# Patient Record
Sex: FEMALE | Race: WHITE | NOT HISPANIC OR LATINO | Employment: FULL TIME | URBAN - METROPOLITAN AREA
[De-identification: names, ages, dates, MRNs, and addresses within clinical notes are randomized per-mention and may not be internally consistent; named-entity substitution may affect disease eponyms.]

---

## 2024-10-01 ENCOUNTER — OFFICE VISIT (OUTPATIENT)
Dept: OBSTETRICS AND GYNECOLOGY | Facility: CLINIC | Age: 19
End: 2024-10-01
Payer: COMMERCIAL

## 2024-10-01 VITALS
DIASTOLIC BLOOD PRESSURE: 80 MMHG | WEIGHT: 193 LBS | SYSTOLIC BLOOD PRESSURE: 110 MMHG | BODY MASS INDEX: 32.95 KG/M2 | HEIGHT: 64 IN

## 2024-10-01 DIAGNOSIS — L02.32 BOIL OF BUTTOCK: ICD-10-CM

## 2024-10-01 DIAGNOSIS — Z23 IMMUNIZATION DUE: ICD-10-CM

## 2024-10-01 DIAGNOSIS — Z23 NONAVALENT HUMAN PAPILLOMA VIRUS (HPV) VACCINE FOR HPV TYPES 6, 11, 16, 18, 31, 33, 45, 52, AND 58 ADMINISTERED: ICD-10-CM

## 2024-10-01 DIAGNOSIS — Z30.011 ENCOUNTER FOR INITIAL PRESCRIPTION OF CONTRACEPTIVE PILLS: ICD-10-CM

## 2024-10-01 DIAGNOSIS — Z01.419 ENCOUNTER FOR ANNUAL ROUTINE GYNECOLOGICAL EXAMINATION: Primary | ICD-10-CM

## 2024-10-01 RX ORDER — BUPROPION HYDROCHLORIDE 150 MG/1
1 TABLET ORAL DAILY
COMMUNITY
Start: 2024-07-25

## 2024-10-01 RX ORDER — DROSPIRENONE AND ETHINYL ESTRADIOL 0.02-3(28)
1 KIT ORAL DAILY
Qty: 84 TABLET | Refills: 3 | Status: SHIPPED | OUTPATIENT
Start: 2024-10-01

## 2024-10-01 RX ORDER — LAMOTRIGINE 100 MG/1
1 TABLET ORAL DAILY
COMMUNITY
Start: 2024-07-25

## 2024-10-01 NOTE — PROGRESS NOTES
Gynecologic Annual Exam Note        Gynecologic Exam        Subjective     HPI  Jennifer España is a 19 y.o.  female who presents for annual well woman exam as a new patient. There were no changes to her medical or surgical history since her last visit.. 2024 Her periods occur every 28-30, lasting 5-6 days.  The flow is light to heavy thru our period. She reports dysmenorrhea is severe occurring first 1-2 days of flow. She occasionally misses work due to it but is most severe at night Marital Status: single.  She is sexually active. She has had new partners.. STD testing recommendations have been explained to the patient and she does desire STD testing.    She previously had issues with weight gain and mood swings on Junel 1/20.     The patient would like to discuss the following complaints today: denies    Additional OB/GYN History   contraceptive methods: None  Desires to: start contraception  Thromboembolic Disease: none  History of migraines: no  Age of menarche: 7    History of STD: no    Last Pap : never. Results: N/A. HPV:  na .   Last Completed Pap Smear       This patient has no relevant Health Maintenance data.             History of abnormal Pap smear: no  Gardasil status:would like to start  Family history of uterine, colon, breast, or ovarian cancer: yes - Pgm breast  Performs monthly Self-Breast Exam: no  Exercises Regularly:semi  Feelings of Anxiety or Depression: yes - medication  Tobacco Usage?: Yes Jennifer España  reports that she has been smoking cigarettes. She does not have any smokeless tobacco history on file. I have educated her on the risk of diseases from using tobacco products such as cancer, COPD, and heart disease.     I advised her to quit and she is willing to quit. We have discussed the following method/s for tobacco cessation:  Education Material, Counseling, and Cold [place].         Current Outpatient Medications:     buPROPion XL (WELLBUTRIN XL) 150 MG 24 hr  "tablet, Take 1 tablet by mouth Daily., Disp: , Rfl:     lamoTRIgine (LaMICtal) 100 MG tablet, Take 1 tablet by mouth Daily., Disp: , Rfl:     drospirenone-ethinyl estradiol (FELIX,GIANVI) 3-0.02 MG per tablet, Take 1 tablet by mouth Daily., Disp: 84 tablet, Rfl: 3  No current facility-administered medications for this visit.     Patient denies the need for medication refills today.    OB History          1    Para        Term                AB   1    Living   0         SAB   1    IAB        Ectopic        Molar        Multiple        Live Births                    Health Maintenance   Topic Date Due    BMI FOLLOWUP  Never done    TDAP/TD VACCINES (2 - Td or Tdap) 2024    INFLUENZA VACCINE  Never done    COVID-19 Vaccine (2023- season) Never done    HEPATITIS C SCREENING  Never done    ANNUAL PHYSICAL  Never done    CHLAMYDIA SCREENING  Never done    PAP SMEAR  Never done    HPV VACCINES (2 - 3-dose series) 10/29/2024    Annual Gynecologic Pelvic and Breast Exam  10/02/2025    MENINGOCOCCAL VACCINE  Completed    Pneumococcal Vaccine 0-64  Aged Out       Past Medical History:   Diagnosis Date    ADHD     Anxiety     Borderline personality disorder     Depression         Past Surgical History:   Procedure Laterality Date    CYST REMOVAL      on chest    TONSILLECTOMY      WISDOM TOOTH EXTRACTION         The additional following portions of the patient's history were reviewed and updated as appropriate: allergies, current medications, past family history, past medical history, past social history, past surgical history, and problem list.    Review of Systems      I have reviewed and agree with the HPI, ROS, and historical information as entered above. Kayden Easley MD         Objective   /80   Ht 162.6 cm (64\")   Wt 87.5 kg (193 lb)   LMP 2024   BMI 33.13 kg/m²     Physical Exam  Vitals and nursing note reviewed. Exam conducted with a chaperone present.   Constitutional:       " General: She is not in acute distress.     Appearance: Normal appearance.   HENT:      Head: Normocephalic and atraumatic.   Pulmonary:      Effort: Pulmonary effort is normal.   Chest:   Breasts:     Right: Normal. No mass, nipple discharge or skin change.      Left: Normal. No mass, nipple discharge or skin change.   Abdominal:      General: There is no distension.      Palpations: Abdomen is soft. There is no mass.      Tenderness: There is no abdominal tenderness.   Genitourinary:     Exam position: Lithotomy position.      Labia:         Right: No lesion.         Left: No lesion.       Vagina: Normal. No vaginal discharge.      Cervix: Normal. No cervical motion tenderness or lesion.      Uterus: Normal.       Adnexa: Right adnexa normal and left adnexa normal.        Right: No tenderness or fullness.          Left: No tenderness or fullness.         Musculoskeletal:         General: Normal range of motion.   Lymphadenopathy:      Upper Body:      Right upper body: No axillary adenopathy.      Left upper body: No axillary adenopathy.   Neurological:      General: No focal deficit present.      Mental Status: She is alert.            Assessment and Plan    Problem List Items Addressed This Visit       Boil of buttock    Current Assessment & Plan     Discourage picking and popping.  Encouraged warm compress.  Patient popped it last night.  No evidence of superimposed infection currently.  If no improvement over the next 5 days or so, can consider antibiotics.         Relevant Medications    drospirenone-ethinyl estradiol (FELIX,GIANSHAMIKA) 3-0.02 MG per tablet     Other Visit Diagnoses       Encounter for annual routine gynecological examination    -  Primary    Relevant Orders    LIQUID-BASED PAP SMEAR WITH HPV GENOTYPING IF ASCUS ([facility])    Hepatitis B Virus (HBV) Screening and Diagnosis    HCV Antibody Rfx To Qnt PCR    HIV-1 / O / 2 Ag / Antibody    Treponema pallidum AB w/Reflex RPR    Nonavalent human  papilloma virus (HPV) vaccine for HPV types 6, 11, 16, 18, 31, 33, 45, 52, and 58 administered        Relevant Medications    HPV 9-Valent Recomb Vaccine suspension 0.5 mL (Completed) (Start on 10/1/2024  2:30 PM)    Encounter for initial prescription of contraceptive pills        Relevant Medications    drospirenone-ethinyl estradiol (FELIX,GIANVI) 3-0.02 MG per tablet    Immunization due                GYN annual well woman exam.   Reviewed pap guidelines.   Encouraged use of condoms for STD prevention.  OCP's/Vaginal Ring - Discussed side effects of nausea, BTB, headaches, breast tenderness and slight weight gain in the first three cycles.  Understands risks of blood clots, stroke, and theoretical risk of breast cancer.  Denies family history of blood clots.  Fibrocystic breast changes - Encouraged decreasing caffeine, supportive bra, low dose vitamin E supplementation.  Reviewed monthly self breast exams.  Instructed to call with lumps, pain, or breast discharge.    Reviewed BMI and weight loss as preventative health measures.   Reviewed exercise as a preventative health measures.   Smoking cessation encouraged.  Resources reviewed. (See above for full cessation details).  Return in about 6 weeks (around 11/12/2024) for Birth control follow up and HPV #2.    Kayden Easley MD  10/01/2024

## 2024-10-01 NOTE — ASSESSMENT & PLAN NOTE
Discourage picking and popping.  Encouraged warm compress.  Patient popped it last night.  No evidence of superimposed infection currently.  If no improvement over the next 5 days or so, can consider antibiotics.

## 2024-10-06 LAB — REF LAB TEST METHOD: NORMAL

## 2024-12-22 ENCOUNTER — HOSPITAL ENCOUNTER (EMERGENCY)
Facility: HOSPITAL | Age: 19
Discharge: HOME | End: 2024-12-22
Attending: STUDENT IN AN ORGANIZED HEALTH CARE EDUCATION/TRAINING PROGRAM | Admitting: STUDENT IN AN ORGANIZED HEALTH CARE EDUCATION/TRAINING PROGRAM
Payer: COMMERCIAL

## 2024-12-22 ENCOUNTER — APPOINTMENT (OUTPATIENT)
Dept: ULTRASOUND IMAGING | Facility: HOSPITAL | Age: 19
End: 2024-12-22
Payer: COMMERCIAL

## 2024-12-22 VITALS
BODY MASS INDEX: 33.29 KG/M2 | DIASTOLIC BLOOD PRESSURE: 82 MMHG | OXYGEN SATURATION: 95 % | SYSTOLIC BLOOD PRESSURE: 128 MMHG | RESPIRATION RATE: 16 BRPM | TEMPERATURE: 98.6 F | WEIGHT: 195 LBS | HEART RATE: 97 BPM | HEIGHT: 64 IN

## 2024-12-22 DIAGNOSIS — N92.1 MENORRHAGIA WITH IRREGULAR CYCLE: Primary | ICD-10-CM

## 2024-12-22 LAB
ALBUMIN SERPL-MCNC: 4.6 G/DL (ref 3.5–5.2)
ALBUMIN/GLOB SERPL: 1.5 G/DL
ALP SERPL-CCNC: 144 U/L (ref 39–117)
ALT SERPL W P-5'-P-CCNC: 31 U/L (ref 1–33)
ANION GAP SERPL CALCULATED.3IONS-SCNC: 13.1 MMOL/L (ref 5–15)
AST SERPL-CCNC: 23 U/L (ref 1–32)
B-HCG UR QL: NEGATIVE
BACTERIA UR QL AUTO: NORMAL /HPF
BASOPHILS # BLD AUTO: 0.05 10*3/MM3 (ref 0–0.2)
BASOPHILS NFR BLD AUTO: 0.5 % (ref 0–1.5)
BILIRUB SERPL-MCNC: 0.2 MG/DL (ref 0–1.2)
BILIRUB UR QL STRIP: NEGATIVE
BUN SERPL-MCNC: 10 MG/DL (ref 6–20)
BUN/CREAT SERPL: 12.5 (ref 7–25)
CALCIUM SPEC-SCNC: 9.2 MG/DL (ref 8.6–10.5)
CHLORIDE SERPL-SCNC: 104 MMOL/L (ref 98–107)
CLARITY UR: CLEAR
CO2 SERPL-SCNC: 22.9 MMOL/L (ref 22–29)
COLOR UR: YELLOW
CREAT SERPL-MCNC: 0.8 MG/DL (ref 0.57–1)
DEPRECATED RDW RBC AUTO: 41 FL (ref 37–54)
EGFRCR SERPLBLD CKD-EPI 2021: 107.7 ML/MIN/1.73
EOSINOPHIL # BLD AUTO: 0 10*3/MM3 (ref 0–0.4)
EOSINOPHIL NFR BLD AUTO: 0 % (ref 0.3–6.2)
ERYTHROCYTE [DISTWIDTH] IN BLOOD BY AUTOMATED COUNT: 12.9 % (ref 12.3–15.4)
GLOBULIN UR ELPH-MCNC: 3 GM/DL
GLUCOSE SERPL-MCNC: 114 MG/DL (ref 65–99)
GLUCOSE UR STRIP-MCNC: NEGATIVE MG/DL
HCT VFR BLD AUTO: 39.6 % (ref 34–46.6)
HGB BLD-MCNC: 12.9 G/DL (ref 12–15.9)
HGB UR QL STRIP.AUTO: ABNORMAL
HYALINE CASTS UR QL AUTO: NORMAL /LPF
IMM GRANULOCYTES # BLD AUTO: 0.02 10*3/MM3 (ref 0–0.05)
IMM GRANULOCYTES NFR BLD AUTO: 0.2 % (ref 0–0.5)
KETONES UR QL STRIP: NEGATIVE
LEUKOCYTE ESTERASE UR QL STRIP.AUTO: NEGATIVE
LYMPHOCYTES # BLD AUTO: 5.1 10*3/MM3 (ref 0.7–3.1)
LYMPHOCYTES NFR BLD AUTO: 54.1 % (ref 19.6–45.3)
MCH RBC QN AUTO: 28.5 PG (ref 26.6–33)
MCHC RBC AUTO-ENTMCNC: 32.6 G/DL (ref 31.5–35.7)
MCV RBC AUTO: 87.4 FL (ref 79–97)
MONOCYTES # BLD AUTO: 0.76 10*3/MM3 (ref 0.1–0.9)
MONOCYTES NFR BLD AUTO: 8.1 % (ref 5–12)
NEUTROPHILS NFR BLD AUTO: 3.49 10*3/MM3 (ref 1.7–7)
NEUTROPHILS NFR BLD AUTO: 37.1 % (ref 42.7–76)
NITRITE UR QL STRIP: NEGATIVE
NRBC BLD AUTO-RTO: 0 /100 WBC (ref 0–0.2)
PH UR STRIP.AUTO: 7 [PH] (ref 5–8)
PLATELET # BLD AUTO: 403 10*3/MM3 (ref 140–450)
PMV BLD AUTO: 9.4 FL (ref 6–12)
POTASSIUM SERPL-SCNC: 3.5 MMOL/L (ref 3.5–5.2)
PROT SERPL-MCNC: 7.6 G/DL (ref 6–8.5)
PROT UR QL STRIP: NEGATIVE
RBC # BLD AUTO: 4.53 10*6/MM3 (ref 3.77–5.28)
RBC # UR STRIP: NORMAL /HPF
REF LAB TEST METHOD: NORMAL
SODIUM SERPL-SCNC: 140 MMOL/L (ref 136–145)
SP GR UR STRIP: 1.01 (ref 1–1.03)
SQUAMOUS #/AREA URNS HPF: NORMAL /HPF
UROBILINOGEN UR QL STRIP: ABNORMAL
WBC # UR STRIP: NORMAL /HPF
WBC NRBC COR # BLD AUTO: 9.42 10*3/MM3 (ref 3.4–10.8)

## 2024-12-22 PROCEDURE — 99284 EMERGENCY DEPT VISIT MOD MDM: CPT | Performed by: STUDENT IN AN ORGANIZED HEALTH CARE EDUCATION/TRAINING PROGRAM

## 2024-12-22 PROCEDURE — 76830 TRANSVAGINAL US NON-OB: CPT

## 2024-12-22 PROCEDURE — 85025 COMPLETE CBC W/AUTO DIFF WBC: CPT | Performed by: NURSE PRACTITIONER

## 2024-12-22 PROCEDURE — 81025 URINE PREGNANCY TEST: CPT | Performed by: NURSE PRACTITIONER

## 2024-12-22 PROCEDURE — 80053 COMPREHEN METABOLIC PANEL: CPT | Performed by: NURSE PRACTITIONER

## 2024-12-22 PROCEDURE — 81001 URINALYSIS AUTO W/SCOPE: CPT | Performed by: NURSE PRACTITIONER

## 2024-12-22 RX ORDER — ARIPIPRAZOLE 5 MG/1
1 TABLET ORAL DAILY
COMMUNITY
Start: 2024-11-27

## 2024-12-22 RX ORDER — IBUPROFEN 600 MG/1
600 TABLET, FILM COATED ORAL EVERY 6 HOURS PRN
Qty: 12 TABLET | Refills: 0 | Status: SHIPPED | OUTPATIENT
Start: 2024-12-22 | End: 2024-12-25

## 2024-12-22 NOTE — ED PROVIDER NOTES
"Subjective:  History of Present Illness:    Patient is a 19-year-old female without terming health history.  Presents to the ER today for intermittent menstrual bleeding.  Reports that bleeding has been going on for last couple weeks.  Reports that it got heavier over the last 24 hours.  Went through a super plus tampon at 30 minutes.  She denies dizziness chest pain shortness of breath or any other associated symptom.  Denies OTC medication or home remedy.  Denies alleviating or exacerbating factors.    Nurses Notes reviewed and agree, including vitals, allergies, social history and prior medical history.     REVIEW OF SYSTEMS: All systems reviewed and not pertinent unless noted.  Review of Systems   Genitourinary:  Positive for vaginal bleeding.   All other systems reviewed and are negative.      Past Medical History:   Diagnosis Date    ADHD     Anxiety     Borderline personality disorder     Depression        Allergies:    Patient has no known allergies.      Past Surgical History:   Procedure Laterality Date    CYST REMOVAL      on chest    TONSILLECTOMY      WISDOM TOOTH EXTRACTION           Social History     Socioeconomic History    Marital status: Single   Tobacco Use    Smoking status: Every Day     Types: Cigarettes    Tobacco comments:     vaping   Substance and Sexual Activity    Alcohol use: Yes    Drug use: Never    Sexual activity: Yes     Partners: Male     Birth control/protection: None         Family History   Problem Relation Age of Onset    Breast cancer Maternal Grandmother     Ovarian cancer Neg Hx     Uterine cancer Neg Hx     Colon cancer Neg Hx        Objective  Physical Exam:  /82 (BP Location: Left arm, Patient Position: Sitting)   Pulse 97   Temp 98.6 °F (37 °C) (Oral)   Resp 16   Ht 162.6 cm (64\")   Wt 88.5 kg (195 lb)   LMP 12/08/2024   SpO2 91%   BMI 33.47 kg/m²      Physical Exam  Vitals and nursing note reviewed.   Constitutional:       Appearance: Normal appearance. " She is normal weight.   HENT:      Head: Normocephalic and atraumatic.      Nose: Nose normal.      Mouth/Throat:      Mouth: Mucous membranes are moist.      Pharynx: Oropharynx is clear.   Eyes:      Extraocular Movements: Extraocular movements intact.      Conjunctiva/sclera: Conjunctivae normal.      Pupils: Pupils are equal, round, and reactive to light.   Cardiovascular:      Rate and Rhythm: Normal rate and regular rhythm.      Pulses: Normal pulses.      Heart sounds: Normal heart sounds.   Pulmonary:      Effort: Pulmonary effort is normal.      Breath sounds: Normal breath sounds.   Abdominal:      General: Abdomen is flat. Bowel sounds are normal.      Palpations: Abdomen is soft.   Musculoskeletal:         General: Normal range of motion.      Cervical back: Normal range of motion and neck supple.   Skin:     General: Skin is warm and dry.      Capillary Refill: Capillary refill takes less than 2 seconds.   Neurological:      General: No focal deficit present.      Mental Status: She is alert and oriented to person, place, and time. Mental status is at baseline.   Psychiatric:         Mood and Affect: Mood normal.         Behavior: Behavior normal.         Thought Content: Thought content normal.         Judgment: Judgment normal.         Procedures    ED Course:         Lab Results (last 24 hours)       Procedure Component Value Units Date/Time    CBC Auto Differential [[id number]]  (Abnormal) Collected: 12/22/24 1544    Specimen: Blood Updated: 12/22/24 1550     WBC 9.42 10*3/mm3      RBC 4.53 10*6/mm3      Hemoglobin 12.9 g/dL      Hematocrit 39.6 %      MCV 87.4 fL      MCH 28.5 pg      MCHC 32.6 g/dL      RDW 12.9 %      RDW-SD 41.0 fl      MPV 9.4 fL      Platelets 403 10*3/mm3      Neutrophil % 37.1 %      Lymphocyte % 54.1 %      Monocyte % 8.1 %      Eosinophil % 0.0 %      Basophil % 0.5 %      Immature Grans % 0.2 %      Neutrophils, Absolute 3.49 10*3/mm3      Lymphocytes, Absolute 5.10  10*3/mm3      Monocytes, Absolute 0.76 10*3/mm3      Eosinophils, Absolute 0.00 10*3/mm3      Basophils, Absolute 0.05 10*3/mm3      Immature Grans, Absolute 0.02 10*3/mm3      nRBC 0.0 /100 WBC     Comprehensive Metabolic Panel [[id number]]  (Abnormal) Collected: 12/22/24 1544    Specimen: Blood Updated: 12/22/24 1609     Glucose 114 mg/dL      BUN 10 mg/dL      Creatinine 0.80 mg/dL      Sodium 140 mmol/L      Potassium 3.5 mmol/L      Chloride 104 mmol/L      CO2 22.9 mmol/L      Calcium 9.2 mg/dL      Total Protein 7.6 g/dL      Albumin 4.6 g/dL      ALT (SGPT) 31 U/L      AST (SGOT) 23 U/L      Alkaline Phosphatase 144 U/L      Total Bilirubin 0.2 mg/dL      Globulin 3.0 gm/dL      A/G Ratio 1.5 g/dL      BUN/Creatinine Ratio 12.5     Anion Gap 13.1 mmol/L      eGFR 107.7 mL/min/1.73     Narrative:      GFR Categories in Chronic Kidney Disease (CKD)      GFR Category          GFR (mL/min/1.73)    Interpretation  G1                     90 or greater         Normal or high (1)  G2                      60-89                Mild decrease (1)  G3a                   45-59                Mild to moderate decrease  G3b                   30-44                Moderate to severe decrease  G4                    15-29                Severe decrease  G5                    14 or less           Kidney failure          (1)In the absence of evidence of kidney disease, neither GFR category G1 or G2 fulfill the criteria for CKD.    eGFR calculation 2021 CKD-EPI creatinine equation, which does not include race as a factor    Pregnancy, Urine - Urine, Clean Catch [[id number]]  (Normal) Collected: 12/22/24 1701    Specimen: Urine, Clean Catch Updated: 12/22/24 1714     HCG, Urine QL Negative    Urinalysis With Microscopic If Indicated (No Culture) - Urine, Clean Catch [[id number]]  (Abnormal) Collected: 12/22/24 1701    Specimen: Urine, Clean Catch Updated: 12/22/24 1714     Color, UA Yellow     Appearance, UA Clear     pH, UA 7.0      Specific Gravity, UA 1.008     Glucose, UA Negative     Ketones, UA Negative     Bilirubin, UA Negative     Blood, UA Trace     Protein, UA Negative     Leuk Esterase, UA Negative     Nitrite, UA Negative     Urobilinogen, UA 0.2 E.U./dL    Urinalysis, Microscopic Only - Urine, Clean Catch [[id number]] Collected: 12/22/24 1701    Specimen: Urine, Clean Catch Updated: 12/22/24 1722     RBC, UA 0-2 /HPF      WBC, UA 0-2 /HPF      Bacteria, UA None Seen /HPF      Squamous Epithelial Cells, UA 0-2 /HPF      Hyaline Casts, UA None Seen /LPF      Methodology Manual Light Microscopy             US Non-ob Transvaginal    Result Date: 12/22/2024  FINAL REPORT TECHNIQUE: null CLINICAL HISTORY: r/o right ovarian torsion. heavy vaginal bleeding. COMPARISON: null FINDINGS: Ultrasound pelvis transvaginal. COMPARISON: None Technique: Real time sonographic imaging, including color-flow imaging, was performed by the sonographer. Multiple representative static images were saved for review. FINDINGS: Anteverted uterus appears normal and measures 8.0 x 3.2 x 4.1 cm. No uterine fibroids identified. Endometrium: 3 mm, normal. Right ovary appears normal and measures 3.2 x 2.2 x 2.1 cm. Normal color and arterial and venous spectral Doppler. No right adnexal mass identified. Normal appearing physiologic follicles/cysts. Left ovary appears normal and measures 3.5 x 2.9 x 1.9 cm. Normal color and arterial spectral Doppler. No left adnexal mass identified. Normal appearing physiologic follicles/cysts. No free fluid identified in the pelvic cul-de-sac.     Impression: IMPRESSION: 1. No cause for patient's symptoms identified. No evidence of ovarian torsion. Authenticated and Electronically Signed by Manoj Stewart MD on 12/22/2024 05:27:03 PM        MDM      Initial impression of presenting illness: Patient is a 19-year-old female without terming health history.  Presents to the ER today for intermittent menstrual bleeding.  Reports that bleeding  has been going on for last couple weeks.  Reports that it got heavier over the last 24 hours.  Went through a super plus tampon at 30 minutes.  She denies dizziness chest pain shortness of breath or any other associated symptom.  Denies OTC medication or home remedy.  Denies alleviating or exacerbating factors.    DDX: includes but is not limited to: Menstrual bleeding, hemorrhagic cyst, ovarian torsion, or other    Patient arrives stable with vitals interpreted by myself.     Pertinent features from physical exam: Lung sounds clear bilaterally throughout.  Ab soft nontender.  Bowel sounds normal.  Heart sounds normal..    Initial diagnostic plan: CBC, CMP, urine pregnancy, urinalysis, transvaginal ultrasound    Results from initial plan were reviewed and interpreted by me revealing CBC is within appropriate range.  CMP is within appropriate range.  Urinalysis positive for blood otherwise negative.  Urine pregnancy test is negative.  Transvaginal ultrasound with the following impression no cause for patient's symptoms identified.  No evidence of ovarian torsion.    Diagnostic information from other sources: Chart review    Interventions / Re-evaluation: Vital signs stable throughout encounter    Results/clinical rationale were discussed with patient    Consultations/Discussion of results with other physicians: N/A    Disposition plan: Patient is hemodynamically stable nontoxic-appearing appropriate discharge.  Let patient follow-up with OB/GYN.  Follow-up ER for new or worsening symptoms.  -----        Final diagnoses:   Menorrhagia with irregular cycle          Ritesh Ashley, APRN  12/22/24 1807       Ritesh Ashley, APRN  12/22/24 1807

## 2024-12-22 NOTE — DISCHARGE INSTRUCTIONS
Please follow-up with your PCP in 1 week.  Follow-up with OB/GYN.  Follow-up with ER for new or worsening symptoms.